# Patient Record
Sex: FEMALE | Race: OTHER | NOT HISPANIC OR LATINO
[De-identification: names, ages, dates, MRNs, and addresses within clinical notes are randomized per-mention and may not be internally consistent; named-entity substitution may affect disease eponyms.]

---

## 2017-07-06 ENCOUNTER — OTHER (OUTPATIENT)
Age: 52
End: 2017-07-06

## 2017-11-21 ENCOUNTER — LABORATORY RESULT (OUTPATIENT)
Age: 52
End: 2017-11-21

## 2017-11-21 ENCOUNTER — APPOINTMENT (OUTPATIENT)
Dept: SURGERY | Facility: CLINIC | Age: 52
End: 2017-11-21
Payer: COMMERCIAL

## 2017-11-21 PROCEDURE — 36415 COLL VENOUS BLD VENIPUNCTURE: CPT

## 2017-11-21 PROCEDURE — 99213 OFFICE O/P EST LOW 20 MIN: CPT | Mod: 25

## 2017-11-23 LAB
T3 SERPL-MCNC: 110 NG/DL
T4 FREE SERPL-MCNC: 1.4 NG/DL
THYROGLOB AB SERPL-ACNC: <20 IU/ML
THYROGLOB SERPL-MCNC: <0.2 NG/ML
TSH SERPL-ACNC: 1.65 UIU/ML

## 2017-12-07 ENCOUNTER — OTHER (OUTPATIENT)
Age: 52
End: 2017-12-07

## 2017-12-12 ENCOUNTER — RESULT REVIEW (OUTPATIENT)
Age: 52
End: 2017-12-12

## 2018-06-28 ENCOUNTER — RESULT REVIEW (OUTPATIENT)
Age: 53
End: 2018-06-28

## 2018-11-13 ENCOUNTER — LABORATORY RESULT (OUTPATIENT)
Age: 53
End: 2018-11-13

## 2018-11-13 ENCOUNTER — APPOINTMENT (OUTPATIENT)
Dept: SURGERY | Facility: CLINIC | Age: 53
End: 2018-11-13
Payer: COMMERCIAL

## 2018-11-13 PROCEDURE — 99213 OFFICE O/P EST LOW 20 MIN: CPT

## 2018-11-13 PROCEDURE — 36415 COLL VENOUS BLD VENIPUNCTURE: CPT

## 2018-11-14 LAB
24R-OH-CALCIDIOL SERPL-MCNC: 61.9 PG/ML
CALCIUM SERPL-MCNC: 9.6 MG/DL
CALCIUM SERPL-MCNC: 9.6 MG/DL
PARATHYROID HORMONE INTACT: 54 PG/ML
T3 SERPL-MCNC: 121 NG/DL
T4 FREE SERPL-MCNC: 1.6 NG/DL
THYROGLOB AB SERPL-ACNC: <20 IU/ML
THYROGLOB SERPL-MCNC: <0.2 NG/ML
TSH SERPL-ACNC: 0.25 UIU/ML

## 2018-11-19 ENCOUNTER — RESULT REVIEW (OUTPATIENT)
Age: 53
End: 2018-11-19

## 2018-12-18 ENCOUNTER — RX RENEWAL (OUTPATIENT)
Age: 53
End: 2018-12-18

## 2019-10-23 ENCOUNTER — RX RENEWAL (OUTPATIENT)
Age: 54
End: 2019-10-23

## 2019-12-05 ENCOUNTER — APPOINTMENT (OUTPATIENT)
Dept: SURGERY | Facility: CLINIC | Age: 54
End: 2019-12-05
Payer: COMMERCIAL

## 2019-12-05 ENCOUNTER — LABORATORY RESULT (OUTPATIENT)
Age: 54
End: 2019-12-05

## 2019-12-05 PROCEDURE — 36415 COLL VENOUS BLD VENIPUNCTURE: CPT

## 2019-12-05 PROCEDURE — 99213 OFFICE O/P EST LOW 20 MIN: CPT

## 2019-12-05 NOTE — ASSESSMENT
[FreeTextEntry1] : bloods drawn.  esophagram requested.  to call next week for results.   to return earlier if any change.  discussed may need to add Cytomel

## 2019-12-05 NOTE — HISTORY OF PRESENT ILLNESS
[de-identified] : 7  years s/p total thyroidectomy for malignancy. feels fatigued on Synthroid 100 mcg daily. denies dysphagia, hoarseness or new lesions. no changes medically since last visit.  occasional abnormal sensation when swallowing

## 2019-12-05 NOTE — PHYSICAL EXAM
[de-identified] : well healed scar [de-identified] : no palpable thyroid bed nodules [Laryngoscopy Performed] : laryngoscopy was performed, see procedure section for findings [Midline] : located in midline position [Normal] : orientation to person, place, and time: normal

## 2019-12-06 LAB
T3 SERPL-MCNC: 102 NG/DL
T4 FREE SERPL-MCNC: 1.5 NG/DL
THYROGLOB AB SERPL-ACNC: <20 IU/ML
THYROGLOB SERPL-MCNC: 0.82 NG/ML
TSH SERPL-ACNC: 1.45 UIU/ML

## 2020-01-09 ENCOUNTER — FORM ENCOUNTER (OUTPATIENT)
Age: 55
End: 2020-01-09

## 2020-01-10 ENCOUNTER — APPOINTMENT (OUTPATIENT)
Dept: RADIOLOGY | Facility: HOSPITAL | Age: 55
End: 2020-01-10

## 2020-01-10 ENCOUNTER — OUTPATIENT (OUTPATIENT)
Dept: OUTPATIENT SERVICES | Facility: HOSPITAL | Age: 55
LOS: 1 days | End: 2020-01-10
Payer: COMMERCIAL

## 2020-01-10 DIAGNOSIS — K21.0 GASTRO-ESOPHAGEAL REFLUX DISEASE WITH ESOPHAGITIS: ICD-10-CM

## 2020-01-10 DIAGNOSIS — C73 MALIGNANT NEOPLASM OF THYROID GLAND: ICD-10-CM

## 2020-01-10 PROCEDURE — 74221 X-RAY XM ESOPHAGUS 2CNTRST: CPT

## 2020-01-10 PROCEDURE — 74221 X-RAY XM ESOPHAGUS 2CNTRST: CPT | Mod: 26

## 2020-01-21 ENCOUNTER — OTHER (OUTPATIENT)
Age: 55
End: 2020-01-21

## 2020-01-27 ENCOUNTER — RX RENEWAL (OUTPATIENT)
Age: 55
End: 2020-01-27

## 2020-04-17 ENCOUNTER — RX RENEWAL (OUTPATIENT)
Age: 55
End: 2020-04-17

## 2020-07-13 LAB
T3 SERPL-MCNC: 133 NG/DL
T4 FREE SERPL-MCNC: 1.8 NG/DL
THYROGLOB AB SERPL-ACNC: <20 IU/ML
THYROGLOB AB SERPL-ACNC: <20 IU/ML
THYROGLOB SERPL-MCNC: 0.28 NG/ML
THYROPEROXIDASE AB SERPL IA-ACNC: 21.9 IU/ML
TSH SERPL-ACNC: 0.17 UIU/ML

## 2020-10-13 ENCOUNTER — RX RENEWAL (OUTPATIENT)
Age: 55
End: 2020-10-13

## 2020-12-29 ENCOUNTER — LABORATORY RESULT (OUTPATIENT)
Age: 55
End: 2020-12-29

## 2020-12-29 ENCOUNTER — APPOINTMENT (OUTPATIENT)
Dept: SURGERY | Facility: CLINIC | Age: 55
End: 2020-12-29
Payer: COMMERCIAL

## 2020-12-29 PROCEDURE — 99072 ADDL SUPL MATRL&STAF TM PHE: CPT

## 2020-12-29 PROCEDURE — 36415 COLL VENOUS BLD VENIPUNCTURE: CPT

## 2020-12-29 PROCEDURE — 99213 OFFICE O/P EST LOW 20 MIN: CPT

## 2020-12-29 NOTE — ASSESSMENT
[FreeTextEntry1] : will observe.  bloods drawn.    to call next week for results.   to return earlier if any change.

## 2020-12-29 NOTE — PHYSICAL EXAM
[de-identified] : well healed scar [de-identified] : no palpable thyroid bed nodules [Laryngoscopy Performed] : laryngoscopy was performed, see procedure section for findings [Midline] : located in midline position [Normal] : orientation to person, place, and time: normal

## 2020-12-29 NOTE — HISTORY OF PRESENT ILLNESS
[de-identified] : 8  years s/p total thyroidectomy for malignancy. feels well  on Synthroid 100 mcg 7 1/2 doses weekly. denies dysphagia, hoarseness or new lesions. no changes medically since last visit.

## 2020-12-30 LAB
T3 SERPL-MCNC: 132 NG/DL
T4 FREE SERPL-MCNC: 1.9 NG/DL
THYROGLOB AB SERPL-ACNC: <20 IU/ML
THYROGLOB SERPL-MCNC: <0.2 NG/ML
TSH SERPL-ACNC: <0.01 UIU/ML

## 2021-01-06 ENCOUNTER — NON-APPOINTMENT (OUTPATIENT)
Age: 56
End: 2021-01-06

## 2021-06-21 ENCOUNTER — RESULT REVIEW (OUTPATIENT)
Age: 56
End: 2021-06-21

## 2021-08-23 ENCOUNTER — RX RENEWAL (OUTPATIENT)
Age: 56
End: 2021-08-23

## 2021-08-23 RX ORDER — LEVOTHYROXINE SODIUM 0.1 MG/1
100 TABLET ORAL
Qty: 90 | Refills: 1 | Status: ACTIVE | COMMUNITY
Start: 2017-12-12 | End: 1900-01-01

## 2021-12-14 ENCOUNTER — APPOINTMENT (OUTPATIENT)
Dept: SURGERY | Facility: CLINIC | Age: 56
End: 2021-12-14
Payer: COMMERCIAL

## 2021-12-14 PROCEDURE — 36415 COLL VENOUS BLD VENIPUNCTURE: CPT

## 2021-12-14 PROCEDURE — 99214 OFFICE O/P EST MOD 30 MIN: CPT | Mod: 25

## 2021-12-14 NOTE — ASSESSMENT
[FreeTextEntry1] : will observe.  bloods drawn.    to call next week for results.   to return earlier if any change. sonogram next visit. patient has been given the opportunity to ask questions, and all of the patient's questions have been answered to their satisfaction\par

## 2021-12-14 NOTE — HISTORY OF PRESENT ILLNESS
[de-identified] : 9   years s/p total thyroidectomy for malignancy. feels well  on Synthroid 100 mcg 7 1/2 doses weekly. denies dysphagia, hoarseness or new lesions. no changes medically since last visit.. I have reviewed all old and new data and available images.\par

## 2021-12-14 NOTE — PHYSICAL EXAM
[de-identified] : well healed scar [de-identified] : no palpable thyroid bed nodules [Laryngoscopy Performed] : laryngoscopy was performed, see procedure section for findings [Midline] : located in midline position [Normal] : orientation to person, place, and time: normal

## 2021-12-15 ENCOUNTER — LABORATORY RESULT (OUTPATIENT)
Age: 56
End: 2021-12-15

## 2021-12-16 LAB
T3 SERPL-MCNC: 121 NG/DL
T4 FREE SERPL-MCNC: 1.7 NG/DL
THYROGLOB AB SERPL-ACNC: <20 IU/ML
THYROGLOB SERPL-MCNC: <0.2 NG/ML
TSH SERPL-ACNC: 0.02 UIU/ML

## 2021-12-22 ENCOUNTER — NON-APPOINTMENT (OUTPATIENT)
Age: 56
End: 2021-12-22

## 2023-01-05 ENCOUNTER — APPOINTMENT (OUTPATIENT)
Dept: SURGERY | Facility: CLINIC | Age: 58
End: 2023-01-05
Payer: COMMERCIAL

## 2023-01-05 DIAGNOSIS — Z00.00 ENCOUNTER FOR GENERAL ADULT MEDICAL EXAMINATION W/OUT ABNORMAL FINDINGS: ICD-10-CM

## 2023-01-05 DIAGNOSIS — C73 MALIGNANT NEOPLASM OF THYROID GLAND: ICD-10-CM

## 2023-01-05 PROCEDURE — 99214 OFFICE O/P EST MOD 30 MIN: CPT | Mod: 25

## 2023-01-05 PROCEDURE — 36415 COLL VENOUS BLD VENIPUNCTURE: CPT

## 2023-01-05 NOTE — ASSESSMENT
[FreeTextEntry1] : will observe.  bloods drawn.    to call next week for results.   to return earlier if any change. patient has been given the opportunity to ask questions, and all of the patient's questions have been answered to their satisfaction\par

## 2023-01-05 NOTE — PHYSICAL EXAM
[de-identified] : well healed scar [de-identified] : no palpable thyroid bed nodules [Laryngoscopy Performed] : laryngoscopy was performed, see procedure section for findings [Midline] : located in midline position [Normal] : orientation to person, place, and time: normal

## 2023-01-05 NOTE — HISTORY OF PRESENT ILLNESS
[de-identified] : 10   years s/p total thyroidectomy for malignancy. feels well  on Synthroid 100 mcg 7 1/2 doses weekly. denies dysphagia, hoarseness or new lesions. no changes medically since last visit.. I have reviewed all old and new data and available images.  recent sonogram [id number]\par

## 2023-01-07 LAB
CALCIUM SERPL-MCNC: 9.2 MG/DL
CALCIUM SERPL-MCNC: 9.2 MG/DL
PARATHYROID HORMONE INTACT: 41 PG/ML
T3 SERPL-MCNC: 134 NG/DL
T4 FREE SERPL-MCNC: 2.1 NG/DL
TSH SERPL-ACNC: 0.01 UIU/ML

## 2023-01-08 LAB
THYROGLOB AB SERPL-ACNC: <20 IU/ML
THYROPEROXIDASE AB SERPL IA-ACNC: 12.6 IU/ML

## 2023-01-13 ENCOUNTER — NON-APPOINTMENT (OUTPATIENT)
Age: 58
End: 2023-01-13

## 2023-01-13 RX ORDER — LEVOTHYROXINE SODIUM 0.1 MG/1
100 TABLET ORAL DAILY
Qty: 90 | Refills: 3 | Status: ACTIVE | COMMUNITY
Start: 2021-12-22 | End: 1900-01-01